# Patient Record
Sex: MALE | Race: WHITE | URBAN - METROPOLITAN AREA
[De-identification: names, ages, dates, MRNs, and addresses within clinical notes are randomized per-mention and may not be internally consistent; named-entity substitution may affect disease eponyms.]

---

## 2017-10-02 ENCOUNTER — OFFICE VISIT (OUTPATIENT)
Dept: SLEEP MEDICINE | Facility: CLINIC | Age: 42
End: 2017-10-02
Payer: COMMERCIAL

## 2017-10-02 VITALS
WEIGHT: 191 LBS | SYSTOLIC BLOOD PRESSURE: 146 MMHG | BODY MASS INDEX: 29.98 KG/M2 | DIASTOLIC BLOOD PRESSURE: 95 MMHG | HEIGHT: 67 IN | RESPIRATION RATE: 16 BRPM | OXYGEN SATURATION: 96 % | HEART RATE: 73 BPM

## 2017-10-02 DIAGNOSIS — I10 ESSENTIAL HYPERTENSION: ICD-10-CM

## 2017-10-02 DIAGNOSIS — G47.19 EXCESSIVE DAYTIME SLEEPINESS: ICD-10-CM

## 2017-10-02 DIAGNOSIS — R06.83 SNORING: Primary | ICD-10-CM

## 2017-10-02 PROCEDURE — 99204 OFFICE O/P NEW MOD 45 MIN: CPT | Performed by: INTERNAL MEDICINE

## 2017-10-02 NOTE — MR AVS SNAPSHOT
After Visit Summary   10/2/2017    Darwin Cotton    MRN: [id number]           Patient Information     Date Of Birth          1975        Visit Information        Provider Department      10/2/2017 12:30 PM Michael Brown MD [facility]        Today's Diagnoses     Snoring    -  1    Excessive daytime sleepiness        Essential hypertension          Care Instructions      Your BMI is Body mass index is 29.91 kg/(m^2).  Weight management is a personal decision.  If you are interested in exploring weight loss strategies, the following discussion covers the approaches that may be successful. Body mass index (BMI) is one way to tell whether you are at a healthy weight, overweight, or obese. It measures your weight in relation to your height.  A BMI of 18.5 to 24.9 is in the healthy range. A person with a BMI of 25 to 29.9 is considered overweight, and someone with a BMI of 30 or greater is considered obese. More than two-thirds of American adults are considered overweight or obese.  Being overweight or obese increases the risk for further weight gain. Excess weight may lead to heart disease and diabetes.  Creating and following plans for healthy eating and physical activity may help you improve your health.  Weight control is part of healthy lifestyle and includes exercise, emotional health, and healthy eating habits. Careful eating habits lifelong are the mainstay of weight control. Though there are significant health benefits from weight loss, long-term weight loss with diet alone may be very difficult to achieve- studies show long-term success with dietary management in less than 10% of people. Attaining a healthy weight may be especially difficult to achieve in those with severe obesity. In some cases, medications, devices and surgical management might be considered.  What can you do?  If you are overweight or obese and are interested in methods for weight loss, you should  discuss this with your provider.     Consider reducing daily calorie intake by 500 calories.     Keep a food journal.     Avoiding skipping meals, consider cutting portions instead.    Diet combined with exercise helps maintain muscle while optimizing fat loss. Strength training is particularly important for building and maintaining muscle mass. Exercise helps reduce stress, increase energy, and improves fitness. Increasing exercise without diet control, however, may not burn enough calories to loose weight.       Start walking three days a week 10-20 minutes at a time    Work towards walking thirty minutes five days a week     Eventually, increase the speed of your walking for 1-2 minutes at time    In addition, we recommend that you review healthy lifestyles and methods for weight loss available through the National Institutes of Health patient information sites:  http://win.niddk.nih.gov/publications/index.htm    And look into health and wellness programs that may be available through your health insurance provider, employer, local community center, or kul[employer] club.    Weight management plan: Patient was referred to their PCP to discuss a diet and exercise plan.  Patient to follow up with Primary Care provider regarding elevated blood pressure.               Follow-ups after your visit        Your next 10 appointments already scheduled     Oct 10, 2017 10:30 AM CDT   HST  with BED 7 SH SLEEP   [facility] ([facility])    [address] [address]   [phone]            Oct 11, 2017  1:30 PM CDT   HST Drop Off with  SLEEP CENTER DME   [facility] ([facility])    [address] [address]   [phone]            Oct 19, 2017  9:00 AM CDT   Return Sleep Patient with Michael Brown MD   [facility] ([facility])    [address] [facility]  "[facility] [address]  [facility] [id number] [phone]              Future tests that were ordered for you today     Open Future Orders        Priority Expected Expires Ordered    HST-Home Sleep Apnea Test Routine  4/3/2018 10/2/2017            Who to contact     If you have questions or need follow up information about today's clinic visit or your schedule please contact [facility] directly at [phone].  Normal or non-critical lab and imaging results will be communicated to you by [employer], letter or phone within 4 business days after the clinic has received the results. If you do not hear from us within 7 days, please contact the clinic through [employer] or phone. If you have a critical or abnormal lab result, we will notify you by phone as soon as possible.  Submit refill requests through [employer] or call your pharmacy and they will forward the refill request to us. Please allow 3 business days for your refill to be completed.          Additional Information About Your Visit        [employer] Information     [employer] gives you secure access to your electronic health record. If you see a primary care provider, you can also send messages to your care team and make appointments. If you have questions, please call your primary care clinic.  If you do not have a primary care provider, please call [phone] and they will assist you.        Care EveryWhere ID     This is your Care EveryWhere ID. This could be used by other organizations to access your [facility] medical records  [id number]        Your Vitals Were     Pulse Respirations Height Pulse Oximetry BMI (Body Mass Index)       73 16 1.702 m (5' 7\") 96% 29.91 kg/m2        Blood Pressure from Last 3 Encounters:   10/02/17 (!) 146/95    Weight from Last 3 Encounters:   10/02/17 86.6 kg (191 lb)               Primary Care Provider    None Specified       No primary provider on file.        Equal Access to Services     MICAH RICHARD AH: Zack hong " danny Hernandez, brigid torresmamadouha, fabiánta kadoug lillianonel, bina curtisin hayaacharleen snyderrufino estiventobin labrucecharleen karissa. So [facility] [phone].    ATENCIÓN: Si habla español, tiene a kohler disposición servicios gratuitos de asistencia lingüística. Deniz al [phone].    We comply with applicable federal civil rights laws and [place] laws. We do not discriminate on the basis of race, color, national origin, age, disability, sex, sexual orientation, or gender identity.            Thank you!     Thank you for choosing [facility]  for your care. Our goal is always to provide you with excellent care. Hearing back from our patients is one way we can continue to improve our services. Please take a few minutes to complete the written survey that you may receive in the mail after your visit with us. Thank you!             Your Updated Medication List - Protect others around you: Learn how to safely use, store and throw away your medicines at www.disposemymeds.org.          This list is accurate as of: 10/2/17  1:03 PM.  Always use your most recent med list.                   Brand Name Dispense Instructions for use Diagnosis    CITALOPRAM HYDROBROMIDE PO      Take 20 mg by mouth daily        LISINOPRIL PO      Take 20 mg by mouth daily

## 2017-10-02 NOTE — PATIENT INSTRUCTIONS

## 2017-10-02 NOTE — NURSING NOTE
"Chief Complaint   Patient presents with     Sleep Problem     Consult, \"Snoring and not sleeping through the night.\"       Initial BP (!) 146/95  Pulse 73  Resp 16  Ht 1.702 m (5' 7\")  Wt 86.6 kg (191 lb)  SpO2 96%  BMI 29.91 kg/m2 Estimated body mass index is 29.91 kg/(m^2) as calculated from the following:    Height as of this encounter: 1.702 m (5' 7\").    Weight as of this encounter: 86.6 kg (191 lb).  Medication Reconciliation: complete     ESS 16  Neck 42cm  Genoveva Lucas    "

## 2017-10-02 NOTE — PROGRESS NOTES
Sleep Evaluation:    Date on this visit: 10/2/2017    Primary Physician: No primary care provider on file.     Chief complaint: snoring, poor sleep quality     Presenting history:     Darwin Cotton reports nightly snoring and poor quality of sleep for 4 years.     Darwin does snore every night. Patient does have a regular bed partner. There is report of snoring, choking and snorting.  He does not have witnessed apneas. They frequently sleep separately.  Patient sleeps on his back and side. He has frequent morning dry mouth.     Darwin denies any bruxism, sleep walking, sleep talking, dream enactment, sleep paralysis, cataplexy and hypnogogic/hypnopompic hallucinations.    He denies restless leg symptoms.     Darwin goes to sleep at 9:00 PM during the week. He wakes up at 5:00 AM without an alarm. He falls asleep in 10 minutes.  Darwin denies difficulty falling asleep.  He wakes up 2-4 times a night for 30 minutes before falling back to sleep.  Darwin wakes up to go to the bathroom and drink water or have a snack.  On weekends, Darwin goes to sleep at 10:00 PM.  He wakes up at 6:00 AM without an alarm. He falls asleep in 5 minutes.  Patient gets an average of 8 hours of sleep per night.     Patient does not use electronics in bed and watch TV in bed.     Darwin does not do shift work.  He works day shifts.      He denies sleep walking and sleep talking as a child.      Darwin denies difficulty breathing through his nose, claustrophobia, reflux at night and heartburn.      Darwin has gained 10-15 pounds in last 2 years.  Patient describes themself as a morning person.       Patient's [place] Sleepiness score 16/24 consistent with moderate daytime sleepiness.      Darwin naps 1-2 times per week for 30-60 minutes, feels refreshed after naps. He takes no inadvertant naps.  He denies closing eyes, dozing and falling asleep while driving.  Patient was counseled on the importance of driving while alert, to  pull over if drowsy, or nap before getting into the vehicle if sleepy.      He uses 3 cups/day of coffee. Last caffeine intake is usually before 4 pm.    He drinks 3-4 beers every night.     Allergies:    Not on File    Medications:    Current Outpatient Prescriptions   Medication Sig Dispense Refill     CITALOPRAM HYDROBROMIDE PO Take 20 mg by mouth daily       LISINOPRIL PO Take 20 mg by mouth daily         Problem List:  There are no active problems to display for this patient.       Past Medical/Surgical History:    Hypertension    Social History:  Social History     Social History     Marital status:      Spouse name: N/A     Number of children: N/A     Years of education: N/A     Occupational History     Not on file.     Social History Main Topics     Smoking status: Not on file     Smokeless tobacco: Not on file     Alcohol use Not on file     Drug use: Not on file     Sexual activity: Not on file     Other Topics Concern     Not on file     Social History Narrative       Family History:    No family history of sleep disorders.     Review of Systems:  A complete review of systems reviewed by me is negative with the exeption of what has been mentioned in the history of present illness.  CONSTITUTIONAL: NEGATIVE for weight gain/loss, fever, chills, sweats or night sweats, drug allergies.  EYES: NEGATIVE for changes in vision, blind spots, double vision.  ENT:  POSITIVE for  ear pain, post-nasal drip and runny nose  CARDIAC: NEGATIVE for fast heartbeats or fluttering in chest, chest pain or pressure, breathlessness when lying flat, swollen legs or swollen feet.  NEUROLOGIC: NEGATIVE headaches, weakness or numbness in the arms or legs.  DERMATOLOGIC: NEGATIVE for rashes, new moles or change in mole(s)  PULMONARY:  POSITIVE for  dry cough  GASTROINTESTINAL: NEGATIVE for nausea or vomitting, loose or watery stools, fat or grease in stools, constipation, abdominal pain, bowel movements black in color or blood  "noted.  GENITOURINARY: NEGATIVE for pain during urination, blood in urine, urinating more frequently than usual, irregular menstrual periods.  MUSCULOSKELETAL: NEGATIVE for muscle pain, bone or joint pain, swollen joints.  ENDOCRINE: NEGATIVE for increased thirst or urination, diabetes.  LYMPHATIC: NEGATIVE for swollen lymph nodes, lumps or bumps in the breasts or nipple discharge.    Physical Examination:  Vitals: BP (!) 146/95  Pulse 73  Resp 16  Ht 1.702 m (5' 7\")  Wt 86.6 kg (191 lb)  SpO2 96%  BMI 29.91 kg/m2  BMI= Body mass index is 29.91 kg/(m^2).    Neck Cir (cm): 42 cm    Umpire Total Score 10/2/2017   Total score - Umpire 16       GENERAL APPEARANCE: healthy, alert and no distress  EYES: Eyes grossly normal to inspection, PERRL and conjunctivae and sclerae normal  HENT: nose and mouth without ulcers or lesions, oropharynx crowded, uvula elongated and tonsillar hypertrophy  NECK: no adenopathy, no asymmetry, masses, or scars and thyroid normal to palpation  RESP: lungs clear to auscultation - no rales, rhonchi or wheezes  CV: regular rates and rhythm, normal S1 S2, no S3 or S4 and no murmur, click or rub  ABDOMEN: Negative  MS: extremities normal- no gross deformities noted  NEURO: Normal strength and tone, mentation intact and speech normal  PSYCH: mentation appears normal and affect normal/bright  Mallampati Class: IV.  Tonsillar Stage: 3  extending beyond pillars.    Impression/Plan:    1. Probable obstructive sleep apnea  2. Snoring   3. Excessive daytime sleepiness  4. Hypertension     - Patient, 43 yo male, with BMI of 29.9 and neck circumference 42 cm, presents with history of loud snoring, non restorative sleep and excessive daytime sleepiness. Medical comorbidity includes hypertension. Obstructive sleep apnea is likely and an overnight sleep study is recommended. Patient can be an appropriate candidate for home sleep apnea testing.     Plan:     1. Home sleep apnea testing     He will " follow up with me in approximately two weeks after his sleep study has been competed to review the results and discuss plan of care.       Sleep study reviewed.  Obstructive sleep apnea reviewed.  Complications of untreated sleep apnea were reviewed.    I spent a total of 30 minutes with patient with more than 50% in counseling     Michael Brown MD     CC: No ref. provider found

## 2017-10-10 ENCOUNTER — OFFICE VISIT (OUTPATIENT)
Dept: SLEEP MEDICINE | Facility: CLINIC | Age: 42
End: 2017-10-10
Payer: COMMERCIAL

## 2017-10-10 DIAGNOSIS — G47.33 OSA (OBSTRUCTIVE SLEEP APNEA): ICD-10-CM

## 2017-10-10 DIAGNOSIS — G47.19 EXCESSIVE DAYTIME SLEEPINESS: ICD-10-CM

## 2017-10-10 DIAGNOSIS — R06.83 SNORING: ICD-10-CM

## 2017-10-10 DIAGNOSIS — I10 ESSENTIAL HYPERTENSION: ICD-10-CM

## 2017-10-10 NOTE — MR AVS SNAPSHOT
After Visit Summary   10/10/2017    Darwin Cotton    MRN: [id number]           Patient Information     Date Of Birth          1975        Visit Information        Provider Department      10/10/2017 10:30 AM BED 7 SH SLEEP [facility]        Today's Diagnoses     Essential hypertension        Excessive daytime sleepiness        Snoring        LESLEY (obstructive sleep apnea)           Follow-ups after your visit        Your next 10 appointments already scheduled     Oct 19, 2017  9:00 AM CDT   Return Sleep Patient with Michael Brown MD   [facility] ([facility])    [address] [id number] [phone]              Who to contact     If you have questions or need follow up information about today's clinic visit or your schedule please contact [facility] directly at [phone].  Normal or non-critical lab and imaging results will be communicated to you by [employer], letter or phone within 4 business days after the clinic has received the results. If you do not hear from us within 7 days, please contact the clinic through [employer] or phone. If you have a critical or abnormal lab result, we will notify you by phone as soon as possible.  Submit refill requests through "[employer]" or call your pharmacy and they will forward the refill request to us. Please allow 3 business days for your refill to be completed.          Additional Information About Your Visit        MyChart Information     "[employer]" gives you secure access to your electronic health record. If you see a primary care provider, you can also send messages to your care team and make appointments. If you have questions, please call your primary care clinic.  If you do not have a primary care provider, please call [phone] and they will assist you.        Care EveryWhere ID     This is your Care EveryWhere ID. This could be used by other  organizations to access your [facility] medical records  [id number]         Blood Pressure from Last 3 Encounters:   10/02/17 (!) 146/95    Weight from Last 3 Encounters:   10/02/17 86.6 kg (191 lb)              We Performed the Following     HST-Home Sleep Apnea Test        Primary Care Provider    None Specified       No primary provider on file.        Equal Access to Services     MICAH [facility]: Hadii aad ku hadasho Soomaali, waaxda luqadaha, qaybta kaalmada adeegyada, bina jean-baptisteparthanilsa otoole . So [facility] [phone].    ATENCIÓN: Si habla español, tiene a kohler disposición servicios gratuitos de asistencia lingüística. Llame al [phone].    We comply with applicable federal civil rights laws and [place] laws. We do not discriminate on the basis of race, color, national origin, age, disability, sex, sexual orientation, or gender identity.            Thank you!     Thank you for choosing [facility]  for your care. Our goal is always to provide you with excellent care. Hearing back from our patients is one way we can continue to improve our services. Please take a few minutes to complete the written survey that you may receive in the mail after your visit with us. Thank you!             Your Updated Medication List - Protect others around you: Learn how to safely use, store and throw away your medicines at www.disposemymeds.org.          This list is accurate as of: 10/10/17 11:59 PM.  Always use your most recent med list.                   Brand Name Dispense Instructions for use Diagnosis    CITALOPRAM HYDROBROMIDE PO      Take 20 mg by mouth daily        LISINOPRIL PO      Take 20 mg by mouth daily

## 2017-10-11 PROBLEM — G47.33 OSA (OBSTRUCTIVE SLEEP APNEA): Status: ACTIVE | Noted: 2017-10-11

## 2017-10-11 PROCEDURE — G0399 HOME SLEEP TEST/TYPE 3 PORTA: HCPCS | Performed by: INTERNAL MEDICINE

## 2017-10-11 NOTE — PROGRESS NOTES
"HOME SLEEP STUDY INTERPRETATION    Patient: Darwin Cotton  MRN: [id number]  YOB: 1975  Study Date: 10/10/2017  Referring Provider: No primary care provider on file.;   Ordering Provider: Michael Brown MD, MD     Indications for Home Study: Darwin Cotton is a 42 year old male with a history of hypertension who presents with symptoms suggestive of obstructive sleep apnea.    Estimated body mass index is 29.91 kg/(m^2) as calculated from the following:    Height as of 10/2/17: 1.702 m (5' 7\").    Weight as of 10/2/17: 86.6 kg (191 lb).  Total score - Kerrick: 16 (10/2/2017 12:00 PM)  STOP-BAN/8    Data: A full night home sleep study was performed recording the standard physiologic parameters including body position, movement, sound, nasal pressure, thermal oral airflow, chest and abdominal movements with respiratory inductance plethysmography, and oxygen saturation by pulse oximetry. Pulse rate was estimated by oximetry recording. This study was considered adequate based on > 4 hours of quality oximetry and respiratory recording. As specified by the AASM Manual for the Scoring of Sleep and Associated events, version 2.3, Rule VIII.D 1B, 4% oxygen desaturation scoring for hypopneas is used as a standard of care on all home sleep apnea testing.    Analysis Time:  443 minutes    Respiration:   Sleep Associated Hypoxemia: sustained hypoxemia was present. Baseline oxygen saturation was 93.5%.  Time with saturation less than or equal to 88% was 11 minutes. The lowest oxygen saturation was 73%.   Snoring: Snoring was present.  Respiratory events: The home study revealed a presence of 29 obstructive apneas and 0 mixed and 4 central apneas. There were 87 hypopneas resulting in a combined apnea/hypopnea index [AHI] of 16.2 events per hour.  AHI was 25.4 per hour supine, - per hour prone, 6.2 per hour on left side, and 10.8 per hour on right side.   Pattern: Excluding events noted above, respiratory rate " and pattern was Normal.    Position: Percent of time spent: supine - 41.6%, prone - 0%, on left - 14.9%, on right - 41.7%.    Heart Rate: By pulse oximetry normal rate was noted.     Assessment:   Moderate obstructive sleep apnea.  Sleep associated hypoxemia was present.    Recommendations:  Consider auto-CPAP at 5-15 cmH2O or oral appliance therapy.  Suggest optimizing sleep hygiene and avoiding sleep deprivation.  Weight management.    Diagnosis Code(s): Obstructive Sleep Apnea G47.33, Hypoxemia G47.36    Michael Brown MD, MD, October 11, 2017   Diplomate, American Board of Psychiatry and Neurology, Sleep Medicine

## 2017-10-12 NOTE — PROGRESS NOTES
Patient instructed on HST use. Patient demonstrated and verbalized knowledge of use. Device programmed to start at 9:00pm. Device will be returned tomorrow before noon.    Meeta SyHaddad  [facility] Clinic - Specialist

## 2017-10-19 ENCOUNTER — OFFICE VISIT (OUTPATIENT)
Dept: SLEEP MEDICINE | Facility: CLINIC | Age: 42
End: 2017-10-19
Payer: COMMERCIAL

## 2017-10-19 VITALS
OXYGEN SATURATION: 99 % | BODY MASS INDEX: 30.17 KG/M2 | HEIGHT: 67 IN | WEIGHT: 192.2 LBS | HEART RATE: 60 BPM | SYSTOLIC BLOOD PRESSURE: 139 MMHG | DIASTOLIC BLOOD PRESSURE: 101 MMHG

## 2017-10-19 DIAGNOSIS — G47.19 EXCESSIVE DAYTIME SLEEPINESS: ICD-10-CM

## 2017-10-19 DIAGNOSIS — G47.33 OSA (OBSTRUCTIVE SLEEP APNEA): Primary | ICD-10-CM

## 2017-10-19 DIAGNOSIS — I10 ESSENTIAL HYPERTENSION: ICD-10-CM

## 2017-10-19 PROCEDURE — 99213 OFFICE O/P EST LOW 20 MIN: CPT | Performed by: INTERNAL MEDICINE

## 2017-10-19 NOTE — PROGRESS NOTES
Sleep Study Follow-Up Visit:    Date on this visit: 10/19/2017    Darwin Cotton comes in today for follow-up of his home sleep study done on 10/10/2017 at the [facility] for possible sleep apnea.    Respiratory events: The home study revealed a presence of 29 obstructive apneas and 0 mixed and 4 central apneas. There were 87 hypopneas resulting in a combined apnea/hypopnea index [AHI] of 16.2 events per hour.  AHI was 25.4 per hour supine, - per hour prone, 6.2 per hour on left side, and 10.8 per hour on right side.   Pattern: Excluding events noted above, respiratory rate and pattern was Normal.     Position: Percent of time spent: supine - 41.6%, prone - 0%, on left - 14.9%, on right - 41.7%.    Sleep Associated Hypoxemia: sustained hypoxemia was present. Baseline oxygen saturation was 93.5%.  Time with saturation less than or equal to 88% was 11 minutes. The lowest oxygen saturation was 73%.   Snoring: Snoring was present.    Assessment:   Moderate obstructive sleep apnea.  Sleep associated hypoxemia was present.    These findings were reviewed with patient.     Past medical/surgical history, family history, social history, medications and allergies were reviewed.      Problem List:  Patient Active Problem List    Diagnosis Date Noted     LESLEY (obstructive sleep apnea) 10/11/2017     Priority: Medium        Impression/Plan:    1. Moderate obstructive sleep apnea   2. Hypertension   3. Excessive daytime sleepiness     - Patient was counseled regarding moderate sleep apnea, consequences of untreated sleep apnea especially in the context of hypertension and sleepiness. Treatment options were discussed. CPAP is treatment of choice and dental appliance will be secondary alternative.      - Patient opts for CPAP therapy.     Plan:     1. Start auto PAP therapy 5-15 cm H2O      He will follow up with me in about 7 week(s).     Fifteen minutes spent with patient, all of which were spent  face-to-face counseling, consulting, coordinating plan of care.      Michael Brown MD     CC: No primary care provider on file.

## 2017-10-19 NOTE — PATIENT INSTRUCTIONS

## 2017-10-19 NOTE — NURSING NOTE
"Chief Complaint   Patient presents with     Study Results     HST follow up       Initial BP (!) 139/101  Pulse 60  Ht 1.702 m (5' 7\")  Wt 87.2 kg (192 lb 3.2 oz)  SpO2 99%  BMI 30.1 kg/m2 Estimated body mass index is 30.1 kg/(m^2) as calculated from the following:    Height as of this encounter: 1.702 m (5' 7\").    Weight as of this encounter: 87.2 kg (192 lb 3.2 oz).  Medication Reconciliation: complete     ESS 11  Genoveva Lucas    "

## 2017-10-19 NOTE — MR AVS SNAPSHOT
After Visit Summary   10/19/2017    Darwin Cotton    MRN: [id number]           Patient Information     Date Of Birth          1975        Visit Information        Provider Department      10/19/2017 9:00 AM Michael Brown MD [facility] [place]        Today's Diagnoses     LESLEY (obstructive sleep apnea)    -  1    Essential hypertension        Excessive daytime sleepiness          Care Instructions      Your BMI is Body mass index is 30.1 kg/(m^2).  Weight management is a personal decision.  If you are interested in exploring weight loss strategies, the following discussion covers the approaches that may be successful. Body mass index (BMI) is one way to tell whether you are at a healthy weight, overweight, or obese. It measures your weight in relation to your height.  A BMI of 18.5 to 24.9 is in the healthy range. A person with a BMI of 25 to 29.9 is considered overweight, and someone with a BMI of 30 or greater is considered obese. More than two-thirds of American adults are considered overweight or obese.  Being overweight or obese increases the risk for further weight gain. Excess weight may lead to heart disease and diabetes.  Creating and following plans for healthy eating and physical activity may help you improve your health.  Weight control is part of healthy lifestyle and includes exercise, emotional health, and healthy eating habits. Careful eating habits lifelong are the mainstay of weight control. Though there are significant health benefits from weight loss, long-term weight loss with diet alone may be very difficult to achieve- studies show long-term success with dietary management in less than 10% of people. Attaining a healthy weight may be especially difficult to achieve in those with severe obesity. In some cases, medications, devices and surgical management might be considered.  What can you do?  If you are overweight or obese and are interested in methods for weight  loss, you should discuss this with your provider.     Consider reducing daily calorie intake by 500 calories.     Keep a food journal.     Avoiding skipping meals, consider cutting portions instead.    Diet combined with exercise helps maintain muscle while optimizing fat loss. Strength training is particularly important for building and maintaining muscle mass. Exercise helps reduce stress, increase energy, and improves fitness. Increasing exercise without diet control, however, may not burn enough calories to loose weight.       Start walking three days a week 10-20 minutes at a time    Work towards walking thirty minutes five days a week     Eventually, increase the speed of your walking for 1-2 minutes at time    In addition, we recommend that you review healthy lifestyles and methods for weight loss available through the National Institutes of Health patient information sites:  http://win.niddk.nih.gov/publications/index.htm    And look into health and wellness programs that may be available through your health insurance provider, employer, local community center, or [employer].    Weight management plan: Patient was referred to their PCP to discuss a diet and exercise plan.  Patient to follow up with Primary Care provider regarding elevated blood pressure.          Follow-ups after your visit        Who to contact     If you have questions or need follow up information about today's clinic visit or your schedule please contact [facility] [place] [facility] directly at [phone].  Normal or non-critical lab and imaging results will be communicated to you by MyChart, letter or phone within 4 business days after the clinic has received the results. If you do not hear from us within 7 days, please contact the clinic through MyChart or phone. If you have a critical or abnormal lab result, we will notify you by phone as soon as possible.  Submit refill requests through [employer] or call your pharmacy and they will  "forward the refill request to us. Please allow 3 business days for your refill to be completed.          Additional Information About Your Visit        GL[employer] Information     [employer] gives you secure access to your electronic health record. If you see a primary care provider, you can also send messages to your care team and make appointments. If you have questions, please call your primary care clinic.  If you do not have a primary care provider, please call [phone] and they will assist you.        Care EveryWhere ID     This is your Care EveryWhere ID. This could be used by other organizations to access your [place] medical records  [id number]        Your Vitals Were     Pulse Height Pulse Oximetry BMI (Body Mass Index)          60 1.702 m (5' 7\") 99% 30.1 kg/m2         Blood Pressure from Last 3 Encounters:   10/19/17 (!) 139/101   10/02/17 (!) 146/95    Weight from Last 3 Encounters:   10/19/17 87.2 kg (192 lb 3.2 oz)   10/02/17 86.6 kg (191 lb)              We Performed the Following     Comprehensive DME        Primary Care Provider    None Specified       No primary provider on file.        Equal Access to Services     [facility] : Hadii chan Hernandez, brigid santana, kena mcdaniels, bina otoole . So [facility] [phone].    ATENCIÓN: Si habla español, tiene a kohler disposición servicios gratuitos de asistencia lingüística. Llame al [phone].    We comply with applicable federal civil rights laws and [place] laws. We do not discriminate on the basis of race, color, national origin, age, disability, sex, sexual orientation, or gender identity.            Thank you!     Thank you for choosing [facility]  for your care. Our goal is always to provide you with excellent care. Hearing back from our patients is one way we can continue to improve our services. Please take a few minutes to complete the written survey that you may receive in " the mail after your visit with us. Thank you!             Your Updated Medication List - Protect others around you: Learn how to safely use, store and throw away your medicines at www.disposemymeds.org.          This list is accurate as of: 10/19/17  9:21 AM.  Always use your most recent med list.                   Brand Name Dispense Instructions for use Diagnosis    CITALOPRAM HYDROBROMIDE PO      Take 20 mg by mouth daily        LISINOPRIL PO      Take 20 mg by mouth daily

## 2017-10-20 ENCOUNTER — TELEPHONE (OUTPATIENT)
Dept: SLEEP MEDICINE | Facility: CLINIC | Age: 42
End: 2017-10-20

## 2017-10-30 ENCOUNTER — DOCUMENTATION ONLY (OUTPATIENT)
Dept: SLEEP MEDICINE | Facility: CLINIC | Age: 42
End: 2017-10-30

## 2017-10-30 DIAGNOSIS — G47.33 OSA (OBSTRUCTIVE SLEEP APNEA): Primary | ICD-10-CM

## 2017-11-02 ENCOUNTER — DOCUMENTATION ONLY (OUTPATIENT)
Dept: SLEEP MEDICINE | Facility: CLINIC | Age: 42
End: 2017-11-02

## 2017-11-02 NOTE — PROGRESS NOTES
3 DAY STM VISIT    Patient contacted for 3 day STM visit  Message left for patient to return call.     Device type: Auto-CPAP  PAP settings: CPAP min 5 cm  H20     CPAP max 15 cm  H20    Assessment: Nightly usage over four hours.  Action plan: Pt to have f/u 14 day STM visit.  Diagnostic AHI: 16.2

## 2017-11-14 ENCOUNTER — DOCUMENTATION ONLY (OUTPATIENT)
Dept: SLEEP MEDICINE | Facility: CLINIC | Age: 42
End: 2017-11-14

## 2017-11-14 DIAGNOSIS — G47.33 OSA (OBSTRUCTIVE SLEEP APNEA): ICD-10-CM

## 2017-11-14 NOTE — PROGRESS NOTES
14 DAY STM VISIT    Message left for patient to return call     Assessment: Pt not meeting objective benchmarks for compliance     Action plan: waiting for patient to return call.  and pt to have 30 day STM visit.    Device type: Auto-CPAP  PAP settings: CPAP min 5 cm  H20     CPAP max 15 cm  H20         95th% pressure 10.9 cm  H20   Objective measures: 14 day rolling measures      Compliance  57 %      Leak  5.89 lpm  last  upload      AHI 0.87   last  upload      Average number of minutes 267    Diagnostic AHI:   16.2    Objective measure goal  Compliance   Goal >70%  Leak   Goal < 24 lpm  AHI  Goal < 5  Usage  Goal >240

## 2017-11-30 ENCOUNTER — DOCUMENTATION ONLY (OUTPATIENT)
Dept: SLEEP MEDICINE | Facility: CLINIC | Age: 42
End: 2017-11-30

## 2017-11-30 DIAGNOSIS — G47.33 OSA (OBSTRUCTIVE SLEEP APNEA): ICD-10-CM

## 2017-11-30 NOTE — PROGRESS NOTES
30 DAY STM VISIT    Message left for patient to return call     Assessment: Pt not meeting objective benchmarks for compliance     Action plan: waiting for patient to return call.  and 2 week STM recheck appt scheduled  Patient has a follow up visit with Dr. Brown on 12/18/2017.   Device type: Auto-CPAP  PAP settings: CPAP min 5 cm  H20     CPAP max 15 cm  H20    95th% pressure 11.6 cm  H20   Objective measures: 14 day rolling measures      Compliance  21 %      Leak  4.56 lpm  last  upload      AHI 0.88   last  upload      Average number of minutes 84    Diagnostic AHI:   16.2        Objective measure goal  Compliance   Goal >70%  Leak   Goal < 24 lpm  AHI  Goal < 5  Usage  Goal >240

## 2017-11-30 NOTE — LETTER
11/30/2017        RE: Darwin Cotton  [address] [place] [address]    Dear Darwin,        Our records show that your sleep apnea is not being effectively treated with CPAP. Since reducing health risks and improving symptoms requires effective treatment in sleep apnea, we would like to help you get to a treatment that is acceptable and works for you.    Your current CPAP data:    Your target  Your use in hours per day    1.5 average    [greater than 4 hours]  Your sleep apnea events  0.88  [less than 5]    Your mask leak        4.56   [less than 24]    We can help to improve your comfort with CPAP and its effectiveness and we can also help you to find alternative treatments if that is your preference. Many of these alternatives to CPAP can be effective and may make sleep more comfortable if you find it difficult to use a mask: oral device, weight loss, sleep positioning devices, surgery, and airway simulation devices. Everyone is different, so the next step in finding out what is best for you is to get in touch with us.      Since we have been unable to reach you to discuss this by phone, we would ask that you call [phone] Jesse,  [phone] Chery or Adriana at [phone]. So that we can help you find the right direction to manage your sleep apnea.      Sincerely Chery     VIRTUAL CARE COORDINATOR 1

## 2017-12-20 ENCOUNTER — DOCUMENTATION ONLY (OUTPATIENT)
Dept: SLEEP MEDICINE | Facility: CLINIC | Age: 42
End: 2017-12-20

## 2017-12-20 DIAGNOSIS — G47.33 OSA (OBSTRUCTIVE SLEEP APNEA): ICD-10-CM

## 2017-12-20 NOTE — PROGRESS NOTES
STM recheck     Data only recheck     Assessment: Pt not meeting objective benchmarks for compliance     Action plan: letter sent to patient requesting contact has he has not responded to previous STM calls and has cancelled his follow up visit.     Device type: Auto-CPAP  PAP settings: CPAP min 5 cm  H20     CPAP max 15 cm  H20    95th% pressure 11.2 cm  H20   Objective measures: 14 day rolling measures      Compliance  28 %      Leak  4.32 lpm  last  upload      AHI 1.56   last  upload      Average number of minutes 110    Diagnostic AHI:   16.2    Objective measure goal  Compliance   Goal >70%  Leak   Goal < 24 lpm  AHI  Goal < 5  Usage  Goal >240

## 2017-12-20 NOTE — LETTER
[facility]  December 20, 2017    Darwin Cotton  [address] [place] [address]  [id number]    Dear Darwin,       Our records show that your sleep apnea is not being effectively treated with CPAP. Since reducing health risks and improving symptoms requires effective treatment in sleep apnea, we would like to help you get to a treatment that is acceptable and works for you.       We can help to improve your comfort with CPAP and its effectiveness and we can also help you to find alternative treatments if that is your preference. Many of these alternatives to CPAP can be effective and may make sleep more comfortable if you find it difficult to use a mask: oral device, weight loss, sleep positioning devices, surgery, and airway simulation devices. Everyone is different, so the next step in finding out what is best for you is to get in touch with us.      Since we have been unable to reach you to discuss this by phone, we would ask that you call [phone] Jesse,  [phone] Chery or Adriana at [phone]. So that we can help you find the right direction to manage your sleep apnea.      Sincerely,  Your sleep virtual care team

## 2018-05-01 ENCOUNTER — DOCUMENTATION ONLY (OUTPATIENT)
Dept: SLEEP MEDICINE | Facility: CLINIC | Age: 43
End: 2018-05-01
Payer: COMMERCIAL

## 2018-05-02 NOTE — PROGRESS NOTES
6 month [facility] Recheck Visit     Diagnostic AHI:  16.2     Data only recheck     Assessment: Pt not meeting objective benchmarks for compliance. Patient was sent a letter and and hasn't answered previously [facility] call. Patient not using his device at this time.   Action plan:   Pt to follow up per provider request (1-2 yrs).       Device type: Auto-CPAP  PAP settings: CPAP min 5 cm  H20     CPAP max 15 cm  H20    95th% pressure  cm  H20   Objective measures: 14 day rolling measures      Compliance  0 %      Leak   lpm  last  upload      AHI    last  upload      Average number of minutes 0      Objective measure goal  Compliance   Goal >70%  Leak   Goal < 24 lpm  AHI  Goal < 5  Usage  Goal >240

## 2019-09-30 ENCOUNTER — HEALTH MAINTENANCE LETTER (OUTPATIENT)
Age: 44
End: 2019-09-30

## 2021-01-15 ENCOUNTER — HEALTH MAINTENANCE LETTER (OUTPATIENT)
Age: 46
End: 2021-01-15

## 2021-10-24 ENCOUNTER — HEALTH MAINTENANCE LETTER (OUTPATIENT)
Age: 46
End: 2021-10-24

## 2022-02-13 ENCOUNTER — HEALTH MAINTENANCE LETTER (OUTPATIENT)
Age: 47
End: 2022-02-13

## 2022-10-15 ENCOUNTER — HEALTH MAINTENANCE LETTER (OUTPATIENT)
Age: 47
End: 2022-10-15

## 2023-03-26 ENCOUNTER — HEALTH MAINTENANCE LETTER (OUTPATIENT)
Age: 48
End: 2023-03-26